# Patient Record
Sex: MALE | Race: WHITE
[De-identification: names, ages, dates, MRNs, and addresses within clinical notes are randomized per-mention and may not be internally consistent; named-entity substitution may affect disease eponyms.]

---

## 2018-02-01 ENCOUNTER — HOSPITAL ENCOUNTER (OUTPATIENT)
Dept: HOSPITAL 50 - VM.SDS | Age: 63
Discharge: HOME | End: 2018-02-01
Attending: FAMILY MEDICINE
Payer: COMMERCIAL

## 2018-02-01 DIAGNOSIS — Z79.51: ICD-10-CM

## 2018-02-01 DIAGNOSIS — J34.2: ICD-10-CM

## 2018-02-01 DIAGNOSIS — Z79.82: ICD-10-CM

## 2018-02-01 DIAGNOSIS — Z79.899: ICD-10-CM

## 2018-02-01 DIAGNOSIS — F17.210: ICD-10-CM

## 2018-02-01 DIAGNOSIS — K57.30: ICD-10-CM

## 2018-02-01 DIAGNOSIS — N52.9: ICD-10-CM

## 2018-02-01 DIAGNOSIS — E78.2: ICD-10-CM

## 2018-02-01 DIAGNOSIS — G47.33: ICD-10-CM

## 2018-02-01 DIAGNOSIS — Z12.11: Primary | ICD-10-CM

## 2018-02-01 PROCEDURE — 45378 DIAGNOSTIC COLONOSCOPY: CPT

## 2018-02-01 NOTE — OR
DATE OF SURGERY:  02/01/2018.

 

REFERRING PROVIDER:  NEISHA Brown.

 

PREOPERATIVE DIAGNOSES:  Screening colonoscopy.  Patient's last colonoscopy was

10 or 11 years ago and was normal.  There is no known family history of colon

cancer or colon polyps.

 

POST-OPERATIVE DIAGNOSES:  Mild diverticulosis, otherwise normal colon.

 

PROCEDURE:  Colonoscopy.

 

SURGEON:  Cresencio Walter M.D.

 

ANESTHESIA:  Monitored anesthesia care.

 

BOWEL PREP:  Good.

 

Torsten is a 62-year-old male was brought to the endoscopy suite after discussing

risks and benefits of the procedure.  Informed consent was obtained for

conscious sedation and colonoscopy with or without biopsy and/or polypectomy.

We also discussed possibility of missed lesions.  Pre-procedure exam was

unremarkable.  IV, oxygen, and monitors were placed.  The patient was placed in

the left lateral decubitus position.  Sedation was administered and a digital

rectal exam was performed which was unremarkable.  Colonoscope was passed into

the rectum and slowly advanced all the way to the cecum.  Cecum was viewed and

photographed.  The colonoscope was slowly withdrawn and the mucosa was closed

observed in a direct circumferential manner.  There was some mild diverticulosis

mainly to the sigmoid area.  The ascending colon was unremarkable.  The

transverse colon was unremarkable.  The descending colon was unremarkable.  The

sigmoid colon was unremarkable.  Retroflexion was performed and rectal mucosa

was unremarkable.  Scope was removed.  The patient tolerated the procedure well.

The patient was monitored until that baseline status.  Discharge instructions

were reviewed and the patient was discharged in good condition.

 

COMPLICATIONS:  None.

 

TOTAL TIME:  15 minutes.

 

ESTIMATED BLOOD LOSS:  None.

 

RECOMMENDATIONS/FOLLOW-UP:  Recommend repeat screening colonoscopy in 10 years

barring any interval change in patient's symptoms or family history.

 

I would like to kindly thank Srikanth West for this referral.

 

 

DMB:  02/01/2018 10:33:37  MODL:  02/01/2018 14:06:27

Job #:  [id number]